# Patient Record
Sex: MALE | Race: WHITE | NOT HISPANIC OR LATINO | URBAN - METROPOLITAN AREA
[De-identification: names, ages, dates, MRNs, and addresses within clinical notes are randomized per-mention and may not be internally consistent; named-entity substitution may affect disease eponyms.]

---

## 2020-07-14 ENCOUNTER — OFFICE VISIT (OUTPATIENT)
Dept: URBAN - METROPOLITAN AREA CLINIC 105 | Facility: CLINIC | Age: 35
End: 2020-07-14

## 2020-07-24 ENCOUNTER — OFFICE VISIT (OUTPATIENT)
Dept: URBAN - METROPOLITAN AREA TELEHEALTH 2 | Facility: TELEHEALTH | Age: 35
End: 2020-07-24
Payer: COMMERCIAL

## 2020-07-24 DIAGNOSIS — R10.84 GENERALIZED ABDOMINAL PAIN: ICD-10-CM

## 2020-07-24 DIAGNOSIS — F43.9 STRESS: ICD-10-CM

## 2020-07-24 DIAGNOSIS — R11.0 NAUSEA: ICD-10-CM

## 2020-07-24 DIAGNOSIS — G47.21 DELAYED SLEEP PHASE SYNDROME: ICD-10-CM

## 2020-07-24 DIAGNOSIS — K58.9 IBS (IRRITABLE BOWEL SYNDROME) DIARRHEA PREDOMINANT: ICD-10-CM

## 2020-07-24 DIAGNOSIS — R14.0 BLOATING: ICD-10-CM

## 2020-07-24 PROCEDURE — 99441 PHONE E/M BY PHYS 5-10 MIN: CPT | Performed by: INTERNAL MEDICINE

## 2020-07-24 RX ORDER — ARMODAFINIL 50 MG/1
TAKE 2 TABLETS BY ORAL ROUTE DAILY TABLET ORAL 1
Qty: 0 | Refills: 0 | Status: ACTIVE | COMMUNITY
Start: 1900-01-01

## 2020-07-24 RX ORDER — IBUPROFEN 200 MG
TABLET ORAL
Qty: 0 | Refills: 0 | Status: ACTIVE | COMMUNITY
Start: 1900-01-01

## 2020-07-24 RX ORDER — MELATONIN 5 MG
TAKE 1 CAPSULE BY ORAL ROUTE DAILY CAPSULE ORAL 1
Qty: 0 | Refills: 0 | Status: ACTIVE | COMMUNITY
Start: 1900-01-01

## 2020-07-24 RX ORDER — BUPROPION HCL 300 MG
TAKE 1 TABLET (300 MG) BY ORAL ROUTE ONCE DAILY TABLET, EXTENDED RELEASE 24 HR ORAL 1
Qty: 0 | Refills: 0 | Status: ACTIVE | COMMUNITY
Start: 1900-01-01

## 2020-07-24 NOTE — HPI-OTHER HISTORIES
The patient is a /White male, who presents in follow-up for further management of IBS/diarrhea predominant and nausea/anorexia possibly acid peptic related.   On 2/16/17, the patient stated that he was diagnosed with IBS 3-5 years ago. His symptoms included frequent diarrhea and an upset stomach but he started having new symptoms of nausea and a decrease in appetite over the past year. He stated occasional heartburn symptoms. He noted he had some rectal bleeding on the toilet paper. He has tried a probiotic. A keto-genic diet helped with IBS symptoms in the past. He stated he has taken off work before (6-8 weeks) due to increased amount of symptoms from stress.   On 6/6/17, the patient noted only occasional nausea relieved with Zofran PRN. He continued on a FODMAP diet and avoided alcohol which could be a trigger for diarrhea. He started on omeprazole 40 mg daily about a week after his EGD. Hyoscyamine helped with symptoms, but he was needing it infrequently at this time. He noted intermittent constipation for 12-24 hrs now, but only up to 5x in the past 3 months. He denied any diarrhea.   On 2/6/20, he reports a recurrence of IBS symptoms in the past several months. This includes diarrhea, gas/bloating, malaise, sweating, abdominal pain, rectal itching, hemorrhoids, and increased frequency. Previously, diet change controlled his symptoms. He recently restarted a FODMAP diet. It provided improvement after a few weeks. He now notes abdominal pain a couple days/week. He has diarrhea 1-2x/week (was several times/day). It s either soft or watery. He has 5-6 BMs/episode of diarrhea. He takes Imodium 2-4 pills in a day with little benefit. Nausea was always rare. He says he s been more stressed lately. He also has a long-term sleep disorder (delayed sleep phase syndrome) that is now interfering with his employment. He follows up with Dr. Zuniga for this. He last saw him on Thursday. Dr. Zuniga prescribed Welbutrin QAM in the past couple of weeks. He has taken a leave of absence from work, but thinks he may have to find another job that accommodates his sleeping schedule.   On 5/12/20, he noted episodes of diarrhea up to 3-4x/day before resuming a FODMAP diet in early Feb. 2020.  Currently, he could at most have diarrhea 1-2x/in a day and as little as 1-2x/week.  He did not trial Lomotil thinking he could have some side-effects from it. He did not see the dietician re low FODMAP diet.  He was taking psyllium 1 to 1 1/2 tsp QD. He was trying to eat a higher fiber diet.    Today, he says he started on Lomotil 1-2 pills/day PRN when he had a bout of diarrhea. He occasionally noted a decrease in frequency after use. He never picked up Xifaxin from the pharmacy.  Labs 2/20/20 - SIBO/glucose test - negative. 2/16/17 - Thyroid studies normal.  Celiac testing negative.

## 2023-10-18 ENCOUNTER — OFFICE VISIT (OUTPATIENT)
Dept: URBAN - METROPOLITAN AREA CLINIC 92 | Facility: CLINIC | Age: 38
End: 2023-10-18
Payer: COMMERCIAL

## 2023-10-18 VITALS
HEART RATE: 85 BPM | HEIGHT: 68 IN | WEIGHT: 150.6 LBS | DIASTOLIC BLOOD PRESSURE: 80 MMHG | SYSTOLIC BLOOD PRESSURE: 135 MMHG | TEMPERATURE: 97.2 F | BODY MASS INDEX: 22.82 KG/M2

## 2023-10-18 DIAGNOSIS — K58.9 IBS (IRRITABLE BOWEL SYNDROME) DIARRHEA PREDOMINANT: ICD-10-CM

## 2023-10-18 DIAGNOSIS — R11.0 NAUSEA: ICD-10-CM

## 2023-10-18 DIAGNOSIS — G47.21 DELAYED SLEEP PHASE SYNDROME: ICD-10-CM

## 2023-10-18 DIAGNOSIS — M54.50 ACUTE BILATERAL LOW BACK PAIN, UNSPECIFIED WHETHER SCIATICA PRESENT: ICD-10-CM

## 2023-10-18 DIAGNOSIS — R10.84 GENERALIZED ABDOMINAL PAIN: ICD-10-CM

## 2023-10-18 DIAGNOSIS — K58.8 OTHER IRRITABLE BOWEL SYNDROME: ICD-10-CM

## 2023-10-18 PROCEDURE — 99204 OFFICE O/P NEW MOD 45 MIN: CPT

## 2023-10-18 RX ORDER — BUPROPION HCL 300 MG
TAKE 1 TABLET (300 MG) BY ORAL ROUTE ONCE DAILY TABLET, EXTENDED RELEASE 24 HR ORAL 1
Qty: 0 | Refills: 0 | Status: DISCONTINUED | COMMUNITY
Start: 1900-01-01

## 2023-10-18 RX ORDER — IBUPROFEN 200 MG
TABLET ORAL
Qty: 0 | Refills: 0 | Status: DISCONTINUED | COMMUNITY
Start: 1900-01-01

## 2023-10-18 RX ORDER — MELATONIN 5 MG
TAKE 1 CAPSULE BY ORAL ROUTE DAILY CAPSULE ORAL 1
Qty: 0 | Refills: 0 | Status: ACTIVE | COMMUNITY
Start: 1900-01-01

## 2023-10-18 RX ORDER — ARMODAFINIL 50 MG/1
TAKE 2 TABLETS BY ORAL ROUTE DAILY TABLET ORAL 1
Qty: 0 | Refills: 0 | Status: DISCONTINUED | COMMUNITY
Start: 1900-01-01

## 2023-10-18 NOTE — HPI-OTHER HISTORIES
The patient is a 38 year old male with a PMH of IBS who presents for abdominal pain.  Patient reports he developed severe lower back pain last Thursday described as a searing sensation graded a 10/10 that radiated into his abdomen. Monday the pain returned still described as stabbing pains throughout his abdomen and lower back. The pain has been intermittent since then. Did have Admits significant nausea but no vomiting. Notes of some nausea today. Only took Pepto Bismol. Patient admits to a few episodes of dysuria. Notes IBS symptoms have been fairly well controlled. Denies NSAID use.   Last night had a sensation of difficulty swallowing. Denies reflux. Admits to chest tightness but more in the abdomen. Currently, on a soft food diet. Last EGD on 2/20/17 with Dr. Ragland revealed 1cm sliding type HH, mild antral gastritis, otherwise normal, antral bx revealed slight chronic inflammation,neg. for H. pylori or IM.  Has not seen another provider for his pain. Patient reports of 1-2 episodes of diarrhea since last Thursday. Denies hematochezia or melena. Social alcohol intake. Denies tobacco or illicit drug use.  Mother with hx of UC. Denies fmhx of colon cancer. Maternal grandfather with history of throat cancer due to smoking. Patient had a concussion 6 weeks ago.

## 2023-10-19 ENCOUNTER — TELEPHONE ENCOUNTER (OUTPATIENT)
Dept: URBAN - METROPOLITAN AREA CLINIC 92 | Facility: CLINIC | Age: 38
End: 2023-10-19

## 2023-10-19 LAB
A/G RATIO: 2.1
ABSOLUTE BASOPHILS: 58
ABSOLUTE EOSINOPHILS: 128
ABSOLUTE LYMPHOCYTES: 2035
ABSOLUTE MONOCYTES: 538
ABSOLUTE NEUTROPHILS: 3642
ALBUMIN: 4.7
ALKALINE PHOSPHATASE: 61
ALT (SGPT): 24
AST (SGOT): 17
BASOPHILS: 0.9
BILIRUBIN, TOTAL: 0.4
BUN/CREATININE RATIO: (no result)
BUN: 11
C-REACTIVE PROTEIN, QUANT: 0.5
CALCIUM: 9.8
CARBON DIOXIDE, TOTAL: 28
CHLORIDE: 98
CREATININE: 0.98
EGFR: 102
EOSINOPHILS: 2
GLOBULIN, TOTAL: 2.2
GLUCOSE: 87
HEMATOCRIT: 40.5
HEMOGLOBIN: 14
LIPASE: 18
LYMPHOCYTES: 31.8
MCH: 29.7
MCHC: 34.6
MCV: 85.8
MONOCYTES: 8.4
MPV: 9.8
NEUTROPHILS: 56.9
PLATELET COUNT: 291
POTASSIUM: 4.2
PROTEIN, TOTAL: 6.9
RDW: 12.4
RED BLOOD CELL COUNT: 4.72
SODIUM: 136
WHITE BLOOD CELL COUNT: 6.4

## 2023-10-20 ENCOUNTER — TELEPHONE ENCOUNTER (OUTPATIENT)
Dept: URBAN - METROPOLITAN AREA CLINIC 92 | Facility: CLINIC | Age: 38
End: 2023-10-20

## 2023-10-20 ENCOUNTER — CLAIMS CREATED FROM THE CLAIM WINDOW (OUTPATIENT)
Dept: URBAN - METROPOLITAN AREA SURGERY CENTER 16 | Facility: SURGERY CENTER | Age: 38
End: 2023-10-20
Payer: COMMERCIAL

## 2023-10-20 ENCOUNTER — CLAIMS CREATED FROM THE CLAIM WINDOW (OUTPATIENT)
Dept: URBAN - METROPOLITAN AREA CLINIC 4 | Facility: CLINIC | Age: 38
End: 2023-10-20
Payer: COMMERCIAL

## 2023-10-20 DIAGNOSIS — K31.89 ACHYLIA: ICD-10-CM

## 2023-10-20 DIAGNOSIS — R10.9 ABDOMINAL CRAMPING: ICD-10-CM

## 2023-10-20 DIAGNOSIS — K31.89 OTHER DISEASES OF STOMACH AND DUODENUM: ICD-10-CM

## 2023-10-20 DIAGNOSIS — R10.84 ABDOMINAL CRAMPING, GENERALIZED: ICD-10-CM

## 2023-10-20 PROCEDURE — 00731 ANES UPR GI NDSC PX NOS: CPT | Performed by: ANESTHESIOLOGY

## 2023-10-20 PROCEDURE — 43239 EGD BIOPSY SINGLE/MULTIPLE: CPT | Performed by: INTERNAL MEDICINE

## 2023-10-20 PROCEDURE — 00731 ANES UPR GI NDSC PX NOS: CPT | Performed by: NURSE ANESTHETIST, CERTIFIED REGISTERED

## 2023-10-20 PROCEDURE — 88305 TISSUE EXAM BY PATHOLOGIST: CPT | Performed by: PATHOLOGY

## 2023-10-20 PROCEDURE — G8907 PT DOC NO EVENTS ON DISCHARG: HCPCS | Performed by: INTERNAL MEDICINE

## 2023-10-24 ENCOUNTER — TELEPHONE ENCOUNTER (OUTPATIENT)
Dept: URBAN - METROPOLITAN AREA CLINIC 92 | Facility: CLINIC | Age: 38
End: 2023-10-24

## 2023-10-25 ENCOUNTER — TELEPHONE ENCOUNTER (OUTPATIENT)
Dept: URBAN - METROPOLITAN AREA CLINIC 92 | Facility: CLINIC | Age: 38
End: 2023-10-25

## 2023-11-02 ENCOUNTER — DASHBOARD ENCOUNTERS (OUTPATIENT)
Age: 38
End: 2023-11-02

## 2023-11-02 ENCOUNTER — OFFICE VISIT (OUTPATIENT)
Dept: URBAN - METROPOLITAN AREA TELEHEALTH 2 | Facility: TELEHEALTH | Age: 38
End: 2023-11-02
Payer: COMMERCIAL

## 2023-11-02 VITALS — BODY MASS INDEX: 22.73 KG/M2 | WEIGHT: 150 LBS | HEIGHT: 68 IN

## 2023-11-02 DIAGNOSIS — R10.84 GENERALIZED ABDOMINAL PAIN: ICD-10-CM

## 2023-11-02 DIAGNOSIS — R11.0 NAUSEA: ICD-10-CM

## 2023-11-02 DIAGNOSIS — K58.8 OTHER IRRITABLE BOWEL SYNDROME: ICD-10-CM

## 2023-11-02 DIAGNOSIS — R93.5 ABNORMAL ABDOMINAL CT SCAN: ICD-10-CM

## 2023-11-02 PROCEDURE — 99213 OFFICE O/P EST LOW 20 MIN: CPT | Performed by: INTERNAL MEDICINE

## 2023-11-02 RX ORDER — MELATONIN 5 MG
TAKE 1 CAPSULE BY ORAL ROUTE DAILY CAPSULE ORAL 1
Qty: 0 | Refills: 0 | Status: ACTIVE | COMMUNITY
Start: 1900-01-01

## 2023-11-02 RX ORDER — HYOSCYAMINE SULFATE 0.12 MG/1
1 TABLET UNDER THE TONGUE AND ALLOW TO DISSOLVE AS NEEDED TABLET SUBLINGUAL THREE TIMES A DAY
Qty: 30 | Refills: 3 | OUTPATIENT
Start: 2023-11-02 | End: 2024-02-29

## 2023-11-02 NOTE — HPI-TODAY'S VISIT:
39 yo male for follow up had been having lower abdominal pain which was migratory. no obvious factors. abdominal pains improving without intervention. BM normal. nausea resolved.    went to ER. CT showed nonspecific colitis

## 2023-11-09 ENCOUNTER — OFFICE VISIT (OUTPATIENT)
Dept: URBAN - METROPOLITAN AREA SURGERY CENTER 16 | Facility: SURGERY CENTER | Age: 38
End: 2023-11-09

## 2023-11-17 ENCOUNTER — CLAIMS CREATED FROM THE CLAIM WINDOW (OUTPATIENT)
Dept: URBAN - METROPOLITAN AREA SURGERY CENTER 16 | Facility: SURGERY CENTER | Age: 38
End: 2023-11-17

## 2023-11-17 ENCOUNTER — OUT OF OFFICE VISIT (OUTPATIENT)
Dept: URBAN - METROPOLITAN AREA SURGERY CENTER 16 | Facility: SURGERY CENTER | Age: 38
End: 2023-11-17
Payer: COMMERCIAL

## 2023-11-17 DIAGNOSIS — R93.3 ABN FINDINGS-GI TRACT: ICD-10-CM

## 2023-11-17 DIAGNOSIS — K57.30 ACQUIRED DIVERTICULOSIS OF COLON: ICD-10-CM

## 2023-11-17 DIAGNOSIS — R93.3 ABNORMAL FINDINGS ON DIAGNOSTIC IMAGING OF OTHER PARTS OF DIGESTIVE TRACT: ICD-10-CM

## 2023-11-17 DIAGNOSIS — K64.8 EXTERNAL HEMORRHOIDS: ICD-10-CM

## 2023-11-17 PROCEDURE — 00811 ANES LWR INTST NDSC NOS: CPT | Performed by: ANESTHESIOLOGIST ASSISTANT

## 2023-11-17 PROCEDURE — 45378 DIAGNOSTIC COLONOSCOPY: CPT | Performed by: INTERNAL MEDICINE

## 2023-11-17 PROCEDURE — G8907 PT DOC NO EVENTS ON DISCHARG: HCPCS | Performed by: INTERNAL MEDICINE

## 2023-11-17 PROCEDURE — 00811 ANES LWR INTST NDSC NOS: CPT | Performed by: ANESTHESIOLOGY
